# Patient Record
Sex: MALE | Race: ASIAN | NOT HISPANIC OR LATINO | Employment: FULL TIME | URBAN - METROPOLITAN AREA
[De-identification: names, ages, dates, MRNs, and addresses within clinical notes are randomized per-mention and may not be internally consistent; named-entity substitution may affect disease eponyms.]

---

## 2022-04-05 DIAGNOSIS — E78.00 PURE HYPERCHOLESTEROLEMIA: ICD-10-CM

## 2022-04-05 DIAGNOSIS — I10 PRIMARY HYPERTENSION: ICD-10-CM

## 2022-04-05 DIAGNOSIS — R73.03 PREDIABETES: ICD-10-CM

## 2022-04-05 DIAGNOSIS — E03.9 ACQUIRED HYPOTHYROIDISM: ICD-10-CM

## 2022-04-05 DIAGNOSIS — Z00.00 PE (PHYSICAL EXAM), ANNUAL: ICD-10-CM

## 2022-04-05 PROBLEM — F17.201 TOBACCO DEPENDENCE IN REMISSION: Status: ACTIVE | Noted: 2021-12-21

## 2022-04-05 PROBLEM — D56.3 THALASSEMIA TRAIT: Status: ACTIVE | Noted: 2021-12-21

## 2022-04-05 PROBLEM — E66.9 OBESITY: Status: ACTIVE | Noted: 2021-12-21

## 2022-05-03 ENCOUNTER — HOSPITAL ENCOUNTER (OUTPATIENT)
Dept: LAB | Facility: MEDICAL CENTER | Age: 47
End: 2022-05-03
Attending: FAMILY MEDICINE
Payer: COMMERCIAL

## 2022-05-03 DIAGNOSIS — Z00.00 PE (PHYSICAL EXAM), ANNUAL: ICD-10-CM

## 2022-05-03 DIAGNOSIS — E78.00 PURE HYPERCHOLESTEROLEMIA: ICD-10-CM

## 2022-05-03 DIAGNOSIS — E03.9 ACQUIRED HYPOTHYROIDISM: ICD-10-CM

## 2022-05-03 DIAGNOSIS — R73.03 PREDIABETES: ICD-10-CM

## 2022-05-03 LAB
ALBUMIN SERPL BCP-MCNC: 4.6 G/DL (ref 3.2–4.9)
ALBUMIN/GLOB SERPL: 1.6 G/DL
ALP SERPL-CCNC: 69 U/L (ref 30–99)
ALT SERPL-CCNC: 28 U/L (ref 2–50)
ANION GAP SERPL CALC-SCNC: 13 MMOL/L (ref 7–16)
AST SERPL-CCNC: 18 U/L (ref 12–45)
BASOPHILS # BLD AUTO: 0.4 % (ref 0–1.8)
BASOPHILS # BLD: 0.04 K/UL (ref 0–0.12)
BILIRUB SERPL-MCNC: 0.7 MG/DL (ref 0.1–1.5)
BUN SERPL-MCNC: 16 MG/DL (ref 8–22)
CALCIUM SERPL-MCNC: 9.1 MG/DL (ref 8.5–10.5)
CHLORIDE SERPL-SCNC: 100 MMOL/L (ref 96–112)
CHOLEST SERPL-MCNC: 129 MG/DL (ref 100–199)
CO2 SERPL-SCNC: 22 MMOL/L (ref 20–33)
CREAT SERPL-MCNC: 0.77 MG/DL (ref 0.5–1.4)
CREAT UR-MCNC: 144.94 MG/DL
EOSINOPHIL # BLD AUTO: 0.36 K/UL (ref 0–0.51)
EOSINOPHIL NFR BLD: 3.3 % (ref 0–6.9)
ERYTHROCYTE [DISTWIDTH] IN BLOOD BY AUTOMATED COUNT: 35.9 FL (ref 35.9–50)
EST. AVERAGE GLUCOSE BLD GHB EST-MCNC: 140 MG/DL
GFR SERPLBLD CREATININE-BSD FMLA CKD-EPI: 112 ML/MIN/1.73 M 2
GLOBULIN SER CALC-MCNC: 2.8 G/DL (ref 1.9–3.5)
GLUCOSE SERPL-MCNC: 168 MG/DL (ref 65–99)
HBA1C MFR BLD: 6.5 % (ref 4–5.6)
HCT VFR BLD AUTO: 42.5 % (ref 42–52)
HDLC SERPL-MCNC: 37 MG/DL
HGB BLD-MCNC: 13.2 G/DL (ref 14–18)
IMM GRANULOCYTES # BLD AUTO: 0.07 K/UL (ref 0–0.11)
IMM GRANULOCYTES NFR BLD AUTO: 0.6 % (ref 0–0.9)
LDLC SERPL CALC-MCNC: 76 MG/DL
LYMPHOCYTES # BLD AUTO: 3.18 K/UL (ref 1–4.8)
LYMPHOCYTES NFR BLD: 29.1 % (ref 22–41)
MCH RBC QN AUTO: 19.8 PG (ref 27–33)
MCHC RBC AUTO-ENTMCNC: 31.1 G/DL (ref 33.7–35.3)
MCV RBC AUTO: 63.8 FL (ref 81.4–97.8)
MICROALBUMIN UR-MCNC: 5.7 MG/DL
MICROALBUMIN/CREAT UR: 39 MG/G (ref 0–30)
MONOCYTES # BLD AUTO: 1.01 K/UL (ref 0–0.85)
MONOCYTES NFR BLD AUTO: 9.2 % (ref 0–13.4)
NEUTROPHILS # BLD AUTO: 6.26 K/UL (ref 1.82–7.42)
NEUTROPHILS NFR BLD: 57.4 % (ref 44–72)
NRBC # BLD AUTO: 0.02 K/UL
NRBC BLD-RTO: 0.2 /100 WBC
PLATELET # BLD AUTO: 308 K/UL (ref 164–446)
PMV BLD AUTO: 9.2 FL (ref 9–12.9)
POTASSIUM SERPL-SCNC: 4 MMOL/L (ref 3.6–5.5)
PROT SERPL-MCNC: 7.4 G/DL (ref 6–8.2)
RBC # BLD AUTO: 6.66 M/UL (ref 4.7–6.1)
SODIUM SERPL-SCNC: 135 MMOL/L (ref 135–145)
T4 FREE SERPL-MCNC: 1.73 NG/DL (ref 0.93–1.7)
TRIGL SERPL-MCNC: 78 MG/DL (ref 0–149)
TSH SERPL DL<=0.005 MIU/L-ACNC: 0.94 UIU/ML (ref 0.38–5.33)
WBC # BLD AUTO: 10.9 K/UL (ref 4.8–10.8)

## 2022-05-03 PROCEDURE — 83036 HEMOGLOBIN GLYCOSYLATED A1C: CPT

## 2022-05-03 PROCEDURE — 84439 ASSAY OF FREE THYROXINE: CPT

## 2022-05-03 PROCEDURE — 84443 ASSAY THYROID STIM HORMONE: CPT

## 2022-05-03 PROCEDURE — 80053 COMPREHEN METABOLIC PANEL: CPT

## 2022-05-03 PROCEDURE — 85025 COMPLETE CBC W/AUTO DIFF WBC: CPT

## 2022-05-03 PROCEDURE — 82043 UR ALBUMIN QUANTITATIVE: CPT

## 2022-05-03 PROCEDURE — 82570 ASSAY OF URINE CREATININE: CPT

## 2022-05-03 PROCEDURE — 80061 LIPID PANEL: CPT

## 2022-05-03 PROCEDURE — 36415 COLL VENOUS BLD VENIPUNCTURE: CPT

## 2022-07-06 ENCOUNTER — OFFICE VISIT (OUTPATIENT)
Dept: MEDICAL GROUP | Age: 47
End: 2022-07-06
Payer: COMMERCIAL

## 2022-07-06 VITALS
OXYGEN SATURATION: 95 % | HEART RATE: 76 BPM | SYSTOLIC BLOOD PRESSURE: 150 MMHG | BODY MASS INDEX: 33.24 KG/M2 | HEIGHT: 63 IN | DIASTOLIC BLOOD PRESSURE: 96 MMHG | TEMPERATURE: 98.6 F | WEIGHT: 187.6 LBS

## 2022-07-06 DIAGNOSIS — E78.00 PURE HYPERCHOLESTEROLEMIA: ICD-10-CM

## 2022-07-06 DIAGNOSIS — R73.03 PREDIABETES: ICD-10-CM

## 2022-07-06 DIAGNOSIS — E03.9 ACQUIRED HYPOTHYROIDISM: ICD-10-CM

## 2022-07-06 DIAGNOSIS — D56.3 THALASSEMIA TRAIT: ICD-10-CM

## 2022-07-06 DIAGNOSIS — D64.9 ANEMIA, UNSPECIFIED TYPE: ICD-10-CM

## 2022-07-06 DIAGNOSIS — I10 PRIMARY HYPERTENSION: ICD-10-CM

## 2022-07-06 PROCEDURE — 99214 OFFICE O/P EST MOD 30 MIN: CPT | Performed by: INTERNAL MEDICINE

## 2022-07-06 RX ORDER — DEXTROMETHORPHAN HYDROBROMIDE AND PROMETHAZINE HYDROCHLORIDE 15; 6.25 MG/5ML; MG/5ML
SYRUP ORAL
COMMUNITY
Start: 2022-01-13 | End: 2022-07-06

## 2022-07-06 RX ORDER — ATORVASTATIN CALCIUM 40 MG/1
40 TABLET, FILM COATED ORAL
COMMUNITY
Start: 2022-05-01 | End: 2022-07-26 | Stop reason: SDUPTHER

## 2022-07-06 RX ORDER — LEVOTHYROXINE SODIUM 137 UG/1
137 TABLET ORAL
COMMUNITY
Start: 2022-06-21 | End: 2022-07-26 | Stop reason: SDUPTHER

## 2022-07-06 RX ORDER — LOSARTAN POTASSIUM 100 MG/1
100 TABLET ORAL DAILY
COMMUNITY
Start: 2022-04-16 | End: 2022-07-06 | Stop reason: SDUPTHER

## 2022-07-06 RX ORDER — DEXTROMETHORPHAN HYDROBROMIDE AND PROMETHAZINE HYDROCHLORIDE 15; 6.25 MG/5ML; MG/5ML
SYRUP ORAL
Qty: 120 ML | Refills: 2 | Status: SHIPPED | OUTPATIENT
Start: 2022-07-06 | End: 2022-07-26

## 2022-07-06 RX ORDER — FOLIC ACID 1 MG/1
1 TABLET ORAL DAILY
Qty: 90 TABLET | Refills: 1 | Status: SHIPPED | OUTPATIENT
Start: 2022-07-06

## 2022-07-06 RX ORDER — METFORMIN HYDROCHLORIDE 500 MG/1
500 TABLET, EXTENDED RELEASE ORAL 2 TIMES DAILY
Qty: 180 TABLET | Refills: 0 | Status: SHIPPED | OUTPATIENT
Start: 2022-07-06 | End: 2022-07-07

## 2022-07-06 RX ORDER — LIDOCAINE HCL 4 %
CREAM (GRAM) TOPICAL
COMMUNITY
Start: 2022-06-01

## 2022-07-06 RX ORDER — AMLODIPINE BESYLATE 10 MG/1
10 TABLET ORAL
COMMUNITY
Start: 2022-04-13 | End: 2022-07-26 | Stop reason: SDUPTHER

## 2022-07-06 RX ORDER — LOSARTAN POTASSIUM 100 MG/1
100 TABLET ORAL DAILY
Qty: 90 TABLET | Refills: 1 | Status: SHIPPED | OUTPATIENT
Start: 2022-07-06

## 2022-07-06 ASSESSMENT — FIBROSIS 4 INDEX: FIB4 SCORE: 0.5

## 2022-07-06 ASSESSMENT — PATIENT HEALTH QUESTIONNAIRE - PHQ9: CLINICAL INTERPRETATION OF PHQ2 SCORE: 0

## 2022-07-06 NOTE — PROGRESS NOTES
CHIEF COMPLAINT  Chief Complaint   Patient presents with   • Weakness     Pt states since last month he has been feeling weak all over his body.   • Fatigue     X 1 month     HPI  Desmond Callahan is a 46 y.o. male who presents today for the following chronic medical conditions:  Hypertension: He has been out of medications, and his blood pressure has been high.  Denies vision problems/headaches, chest pain/pressure, palpitations/irregular heartbeats.  BMI 33.  Hypercholesterolemia: The patient had abnormal lipid panel in the past.  Current LDL is 76, total cholesterol 129.  No medications.  Diet: Regular.  Exercise: Limited.  BMI 33..  IFG: The patient has history of prediabetes.  The last FBG is 168, A1c 6.5, microalbumin/creatinine ratio 39.  On metformin, 500 mg twice daily.  Complains of fatigue.  Denies polydipsia, polyuria.  Thalassemia trait/anemia: Reviewed CBC, consistent with thalassemia.  Complains of fatigue.  He has not been on B12 or folate supplements.    Reviewed PMH, PSH, FH, SH, ALL, HCM/IMM, no changes  Reviewed MEDS, no changes    Patient Active Problem List    Diagnosis Date Noted   • Prediabetes 04/05/2022   • Primary hypertension 04/05/2022   • Pure hypercholesterolemia 04/05/2022   • Thalassemia trait 12/21/2021   • Obesity 12/21/2021   • Tobacco dependence in remission 12/21/2021   • Calculus of gallbladder with other cholecystitis, without mention of obstruction 09/25/2015   • Hypothyroid 04/17/2014   • Insomnia 04/17/2014     CURRENT MEDICATIONS  Current Outpatient Medications   Medication Sig Dispense Refill   • amLODIPine (NORVASC) 10 MG Tab Take 10 mg by mouth every day.     • atorvastatin (LIPITOR) 40 MG Tab Take 40 mg by mouth every day. FOR 30 DAYS     • [facility] VITAMIN D3 250 MCG (20792 UT) Cap TAKE 5 CAPSULES (50,000 UNITS TOTAL) BY MOUTH ONCE A WEEK FOR 30 DAYS *OTC NOT COVERED     • levothyroxine (SYNTHROID) 137 MCG Tab Take 137 mcg by mouth every morning before breakfast.     •  folic acid (FOLVITE) 1 MG Tab Take 1 Tablet by mouth every day. 90 Tablet 1   • metFORMIN ER (GLUCOPHAGE XR) 500 MG TABLET SR 24 HR Take 1 Tablet by mouth 2 times a day. 180 Tablet 0   • losartan (COZAAR) 100 MG Tab Take 1 Tablet by mouth every day. 90 Tablet 1   • promethazine-dextromethorphan (PROMETHAZINE-DM) 6.25-15 MG/5ML syrup TAKE 5 ML BY MOUTH EVERY 6 HOURS AS NEEDED 120 mL 2   • SYNTHROID 100 MCG Tab TAKE ONE TABLET BY MOUTH ONCE DAILY (Patient not taking: Reported on 2022) 90 Tab 4     No current facility-administered medications for this visit.     ALLERGIES  Allergies: Patient has no known allergies.  PAST MEDICAL HISTORY  Past Medical History:   Diagnosis Date   • Primary hypertension 2022   • Pure hypercholesterolemia 2022   • Thalassemia trait 2021   • Tobacco dependence in remission 2021   • Other specified disorder of intestines 2015    diarrhea   • Hypothyroid 2014   • GERD (gastroesophageal reflux disease)    • Headache(784.0)    • Hepatitis B      SURGICAL HISTORY  He  has a past surgical history that includes dental extraction(s) and abdoulaye by laparoscopy (2015).  SOCIAL HISTORY  Social History     Tobacco Use   • Smoking status: Former Smoker     Packs/day: 1.00     Years: 20.00     Pack years: 20.00     Types: Cigarettes   • Smokeless tobacco: Never Used   Substance Use Topics   • Alcohol use: No     Alcohol/week: 1.5 oz     Types: 3 Cans of beer per week   • Drug use: No     Social History     Social History Narrative   • Not on file     FAMILY HISTORY  Family History   Problem Relation Age of Onset   • Cancer Mother         stomach   • Diabetes Father    • Heart Disease Father    • Hypertension Father    • Hyperlipidemia Father    • Lung Disease Father         Smoker     Family Status   Relation Name Status   • Mo     • Fa     • Sis  Alive   • Bro  Alive   • MAunt     • MUnc     • PAunt     • PUnc     • MGMo   "   • MGFa     • PGMo     • PGFa       ROS   Constitutional: Negative for fever, chills.  HENT: Negative for congestion, sore throat.  Eyes: Negative for vision problems.   Respiratory: Negative for cough, shortness of breath.  Cardiovascular: Negative for chest pain, palpitations.   Gastrointestinal: Negative for heartburn, nausea, abdominal pain.   Genitourinary: Negative for dysuria.  Musculoskeletal: Negative for significant myalgia, back and joint pain.   Skin: Negative for rash.   Neuro: Negative for dizziness, weakness and headaches.   Endo/Heme/Allergies: Does not bruise/bleed easily.   Psychiatric/Behavioral: Negative for depression.    PHYSICAL EXAM   Blood Pressure (Abnormal) 150/96 (BP Location: Right arm, Patient Position: Sitting, BP Cuff Size: Adult)   Pulse 76   Temperature 37 °C (98.6 °F) (Temporal)   Height 1.6 m (5' 3\")   Weight 85.1 kg (187 lb 9.6 oz)   Oxygen Saturation 95%  Body mass index is 33.23 kg/m².  General:  NAD, well appearing  HEENT:   NC/AT, PERRLA, EOMI.  Cardiovascular: unlabored breathing, no peripheral cyanosis or swelling.  Lungs:   no respiratory distress.  Abdomen: non- distended.  Extremities:  No LE swelling.  Skin:  Warm, dry.  No erythema. No rash.   Neurologic: Alert & oriented x 3. CN II-XII grossly intact. No focal deficits.  Psychiatric:  Affect normal, mood normal, judgment normal.    Labs     Labs are reviewed and discussed with a patient  Lab Results   Component Value Date/Time    CHOLSTRLTOT 129 2022 07:16 AM    LDL 76 2022 07:16 AM    HDL 37 (A) 2022 07:16 AM    TRIGLYCERIDE 78 2022 07:16 AM       Lab Results   Component Value Date/Time    SODIUM 135 2022 07:16 AM    POTASSIUM 4.0 2022 07:16 AM    CHLORIDE 100 2022 07:16 AM    CO2 22 2022 07:16 AM    GLUCOSE 168 (H) 2022 07:16 AM    BUN 16 2022 07:16 AM    CREATININE 0.77 2022 07:16 AM     Lab Results   Component " Value Date/Time    ALKPHOSPHAT 69 05/03/2022 07:16 AM    ASTSGOT 18 05/03/2022 07:16 AM    ALTSGPT 28 05/03/2022 07:16 AM    TBILIRUBIN 0.7 05/03/2022 07:16 AM      Lab Results   Component Value Date/Time    HBA1C 6.5 (H) 05/03/2022 07:16 AM     No results found for: TSH  Lab Results   Component Value Date/Time    FREET4 1.73 (H) 05/03/2022 07:16 AM    FREET4 1.05 06/25/2014 11:30 AM     Lab Results   Component Value Date/Time    WBC 10.9 (H) 05/03/2022 07:16 AM    RBC 6.66 (H) 05/03/2022 07:16 AM    HEMOGLOBIN 13.2 (L) 05/03/2022 07:16 AM    HEMATOCRIT 42.5 05/03/2022 07:16 AM    MCV 63.8 (L) 05/03/2022 07:16 AM    MCH 19.8 (L) 05/03/2022 07:16 AM    MCHC 31.1 (L) 05/03/2022 07:16 AM    MPV 9.2 05/03/2022 07:16 AM    NEUTSPOLYS 57.40 05/03/2022 07:16 AM    LYMPHOCYTES 29.10 05/03/2022 07:16 AM    MONOCYTES 9.20 05/03/2022 07:16 AM    EOSINOPHILS 3.30 05/03/2022 07:16 AM    BASOPHILS 0.40 05/03/2022 07:16 AM    HYPOCHROMIA 2+ 04/17/2014 09:46 AM    ANISOCYTOSIS 1+ 11/14/2013 09:10 AM      Imaging     None    Assessment and Plan     Desmond Callahan is a 46 y.o. male    1. Primary hypertension  Noncompliance, ran out of medications, blood pressure is high but he has been asymptomatic.  Restart losartan, advised low-calorie/salt diet, daily exercise, weight control  - Comp Metabolic Panel; Future  - losartan (COZAAR) 100 MG Tab; Take 1 Tablet by mouth every day.  Dispense: 90 Tablet; Refill: 1  - Patient identified as having weight management issue.  Appropriate orders and counseling given.    2. Pure hypercholesterolemia  LDL was 76, total cholesterol 129, no medications.  Advised for lifestyle as above  - Comp Metabolic Panel; Future  - Patient identified as having weight management issue.  Appropriate orders and counseling given.    3. Prediabetes  He has been on metformin, he has FBG in diabetes range.  He will continue metformin current prescription, switch to metformin extended release, the same dose after out  of supply  Advised for lifestyle as above  - metFORMIN ER (GLUCOPHAGE XR) 500 MG TABLET SR 24 HR; Take 1 Tablet by mouth 2 times a day.  Dispense: 180 Tablet; Refill: 0  - HEMOGLOBIN A1C; Future  - Comp Metabolic Panel; Future  - Patient identified as having weight management issue.  Appropriate orders and counseling given.    4. Acquired hypothyroidism  He is asymptomatic regarding to elevated FT4, normal TSH.  In review of BMI 33, 8, levothyroxine dose was not changed  - levothyroxine (SYNTHROID) 137 MCG Tab; Take 137 mcg by mouth every morning before breakfast.    5. Thalassemia trait  Reviewed CBC, given folic acid, follow-up labs  - folic acid (FOLVITE) 1 MG Tab; Take 1 Tablet by mouth every day.  Dispense: 90 Tablet; Refill: 1  6. Anemia, unspecified type  - folic acid (FOLVITE) 1 MG Tab; Take 1 Tablet by mouth every day.  Dispense: 90 Tablet; Refill: 1  - CBC WITH DIFFERENTIAL; Future  - IRON/TOTAL IRON BIND; Future  - VIT B12,  FOLIC ACID    Counseling:   - Smoking:  Nonsmoker    Followup: Return in about 2 weeks (around 7/20/2022) for LABS.    All questions are answered.    Please note that this dictation was created using voice recognition software, and that there might be errors of oliverio and possibly content.

## 2022-07-06 NOTE — TELEPHONE ENCOUNTER
Received request via: Pharmacy    Was the patient seen in the last year in this department? Yes 7/1/22    Does the patient have an active prescription (recently filled or refills available) for medication(s) requested? No     Pharmacy comment: Script Clarification:PLEASE VERIFY DRUG/DOSING. PT HAS BEEN ON METFORMIN IR. NOW SENT IN AS METFORMIN ER TWICE DAILY DOSING.

## 2022-07-26 ENCOUNTER — OFFICE VISIT (OUTPATIENT)
Dept: MEDICAL GROUP | Age: 47
End: 2022-07-26
Payer: COMMERCIAL

## 2022-07-26 VITALS
HEART RATE: 67 BPM | OXYGEN SATURATION: 98 % | SYSTOLIC BLOOD PRESSURE: 136 MMHG | WEIGHT: 186 LBS | BODY MASS INDEX: 32.96 KG/M2 | TEMPERATURE: 97 F | DIASTOLIC BLOOD PRESSURE: 82 MMHG | HEIGHT: 63 IN

## 2022-07-26 DIAGNOSIS — Z20.5 EXPOSURE TO HEPATITIS B: ICD-10-CM

## 2022-07-26 DIAGNOSIS — Z76.89 ENCOUNTER TO ESTABLISH CARE WITH NEW DOCTOR: ICD-10-CM

## 2022-07-26 DIAGNOSIS — Z23 NEED FOR VACCINATION: ICD-10-CM

## 2022-07-26 DIAGNOSIS — I10 PRIMARY HYPERTENSION: ICD-10-CM

## 2022-07-26 DIAGNOSIS — D56.3 THALASSEMIA TRAIT: ICD-10-CM

## 2022-07-26 DIAGNOSIS — I49.3 PVC'S (PREMATURE VENTRICULAR CONTRACTIONS): ICD-10-CM

## 2022-07-26 DIAGNOSIS — E03.9 ACQUIRED HYPOTHYROIDISM: ICD-10-CM

## 2022-07-26 DIAGNOSIS — E78.00 PURE HYPERCHOLESTEROLEMIA: ICD-10-CM

## 2022-07-26 DIAGNOSIS — Z87.891 FORMER SMOKER: ICD-10-CM

## 2022-07-26 DIAGNOSIS — D64.9 MILD ANEMIA: ICD-10-CM

## 2022-07-26 DIAGNOSIS — R80.9 MICROALBUMINURIA: ICD-10-CM

## 2022-07-26 DIAGNOSIS — E11.9 TYPE 2 DIABETES MELLITUS WITHOUT COMPLICATION, WITHOUT LONG-TERM CURRENT USE OF INSULIN (HCC): ICD-10-CM

## 2022-07-26 DIAGNOSIS — J30.9 ALLERGIC RHINITIS, UNSPECIFIED SEASONALITY, UNSPECIFIED TRIGGER: ICD-10-CM

## 2022-07-26 PROBLEM — F17.201 TOBACCO DEPENDENCE IN REMISSION: Status: RESOLVED | Noted: 2021-12-21 | Resolved: 2022-07-26

## 2022-07-26 PROCEDURE — 90471 IMMUNIZATION ADMIN: CPT | Performed by: FAMILY MEDICINE

## 2022-07-26 PROCEDURE — 90677 PCV20 VACCINE IM: CPT | Performed by: FAMILY MEDICINE

## 2022-07-26 PROCEDURE — 99215 OFFICE O/P EST HI 40 MIN: CPT | Mod: 25 | Performed by: FAMILY MEDICINE

## 2022-07-26 RX ORDER — DEXTROMETHORPHAN HYDROBROMIDE AND PROMETHAZINE HYDROCHLORIDE 15; 6.25 MG/5ML; MG/5ML
SYRUP ORAL
Qty: 120 ML | Refills: 2 | Status: SHIPPED | OUTPATIENT
Start: 2022-07-26

## 2022-07-26 RX ORDER — METOPROLOL SUCCINATE 25 MG/1
25 TABLET, EXTENDED RELEASE ORAL DAILY
Qty: 90 TABLET | Refills: 3 | Status: SHIPPED | OUTPATIENT
Start: 2022-07-26 | End: 2023-08-17

## 2022-07-26 RX ORDER — ATORVASTATIN CALCIUM 40 MG/1
40 TABLET, FILM COATED ORAL
Qty: 90 TABLET | Refills: 3 | Status: SHIPPED | OUTPATIENT
Start: 2022-07-26

## 2022-07-26 RX ORDER — AMLODIPINE BESYLATE 10 MG/1
10 TABLET ORAL
Qty: 90 TABLET | Refills: 1 | Status: SHIPPED | OUTPATIENT
Start: 2022-07-26

## 2022-07-26 RX ORDER — LEVOTHYROXINE SODIUM 0.12 MG/1
137 TABLET ORAL
Qty: 90 TABLET | Refills: 3 | Status: SHIPPED | OUTPATIENT
Start: 2022-07-26

## 2022-07-26 ASSESSMENT — FIBROSIS 4 INDEX: FIB4 SCORE: 0.51

## 2022-07-26 NOTE — PROGRESS NOTES
Subjective:   CC: Establish care    HPI:     Desmond Callahan is a 47 y.o. male, established patient of the clinic.     Patient was previously diagnosed with prediabetes, hypothyroidism, hypertension, hyperlipidemia, thalassemia trait with mild anemia.  Patient is taking all medications as directed.  His last blood tests was in May 2022 which reveals A1c of 5.1.  Patient tolerated all his medications well, no side effect reported.    Patient is , sexually active, has 2 children, works as a  for local [employer].    He complains of intermittent nocturnal cough with copious sputum production.  He also complains of severe nasal congestion especially on the left, sneezing, early morning sputum production, rhinorrhea.  Patient has been taking promethazine-dextromethorphan syrup as needed for sleep at night, which appeared to help.  He endorses intermittent symptoms of GERD with some type of foods.  However, he usually does not have GERD at night.    Patient had 48-hour Holter monitor done in February 2022 for intermittent palpitation and couple episodes of syncope with standing.  Patient had not returned to his previous doctor or followed up with cardiology to discuss findings.  Negative history of drugs, alcohol, excessive consumption of stimulants.  Patient is a former smoker.  He used to smoke 1 pack/day for 20 years.  He quit in 2017.  He drinks approximately 2 L of fluid per day.           Current medicines (including changes today)  Current Outpatient Medications   Medication Sig Dispense Refill   • levothyroxine (SYNTHROID) 125 MCG Tab Take 1 Tablet by mouth every morning before breakfast. 90 Tablet 3   • atorvastatin (LIPITOR) 40 MG Tab Take 1 Tablet by mouth every day. FOR 30 DAYS 90 Tablet 3   • amLODIPine (NORVASC) 10 MG Tab Take 1 Tablet by mouth every day. 90 Tablet 1   • metFORMIN (GLUCOPHAGE) 500 MG Tab Take 2 Tablets by mouth 2 times a day with meals. 360 Tablet 1   • metoprolol SR  "(TOPROL XL) 25 MG TABLET SR 24 HR Take 1 Tablet by mouth every day. 90 Tablet 3   • promethazine-dextromethorphan (PROMETHAZINE-DM) 6.25-15 MG/5ML syrup TAKE 5 ML BY MOUTH EVERY 6 HOURS AS NEEDED 120 mL 2   • [facility] VITAMIN D3 250 MCG (62528 UT) Cap TAKE 5 CAPSULES (50,000 UNITS TOTAL) BY MOUTH ONCE A WEEK FOR 30 DAYS *OTC NOT COVERED     • folic acid (FOLVITE) 1 MG Tab Take 1 Tablet by mouth every day. 90 Tablet 1   • losartan (COZAAR) 100 MG Tab Take 1 Tablet by mouth every day. 90 Tablet 1     No current facility-administered medications for this visit.     He  has a past medical history of GERD (gastroesophageal reflux disease), Headache(784.0), Hepatitis B, Hypothyroid (4/17/2014), Other specified disorder of intestines (9/2015), Primary hypertension (4/5/2022), Pure hypercholesterolemia (4/5/2022), Thalassemia trait (12/21/2021), and Tobacco dependence in remission (12/21/2021).    He has no past medical history of Allergy, Anxiety, Arrhythmia, Arthritis, ASTHMA, Blood transfusion, Cancer (HCC), CATARACT, CHF (congestive heart failure) (HCC), Clotting disorder (HCC), COPD, Depression, Diabetes, EMPHYSEMA, Glaucoma, Goiter, Heart attack (HCC), Heart murmur, HIV (human immunodeficiency virus infection), IBD (inflammatory bowel disease), Kidney disease, Meningitis, Migraine, Seizure (HCC), Stroke (HCC), Tuberculosis, Ulcer, or Urinary tract infection, site not specified.    I reviewed patient's problem list, allergies, medications, family hx, social hx with patient and update EPIC.        Objective:     /82 (BP Location: Left arm, Patient Position: Sitting, BP Cuff Size: Large adult)   Pulse 67   Temp 36.1 °C (97 °F) (Temporal)   Ht 1.6 m (5' 3\")   Wt 84.4 kg (186 lb)   SpO2 98%  Body mass index is 32.95 kg/m².    Physical Exam:  Constitutional: awake, alert, in no distress.  Skin: Warm, dry, good turgor, no rashes, bruises, ulcers in visible areas.  Eye: conjunctiva clear, lids neg for edema or " lesions.  ENMT: TM and auditory canals wnl.  Pale, congested nasal mucosa nasal mucosa with inferior nasal turbinate hypertrophy. Lips without lesions, good dentition, oropharynx clear.  Right to left nasal septal deviation.  Neck: Trachea midline, no masses, no thyromegaly. No cervical or supraclavicular lymphadenopathy  Respiratory: Unlabored respiratory effort, lungs clear to auscultation, no wheezes, no rales.  Cardiovascular: Normal S1, S2, no murmur, no pedal edema.  Abdomen: Soft, non-tender to palpation, active BS, no hernia, no hepatosplenomegaly, negative rebound or guarding.   Neuro: CN2-12 grossly intact. Strength 5/5, reflexes 2/4 in all extremities, no sensory deficits.   Psych: Oriented x3, affect and mood wnl, intact judgement and insight.       Assessment and Plan:   The following treatment plan was discussed    1. Type 2 diabetes mellitus without complication, without long-term current use of insulin (HCC)  Patient was previously diagnosed with prediabetes, currently taking metformin 500 mg twice daily.  His most recent labs show A1C of 6.5.   - metFORMIN (GLUCOPHAGE) 500 MG Tab; Take 2 Tablets by mouth 2 times a day with meals.  Dispense: 360 Tablet; Refill: 1  - HEMOGLOBIN A1C; Future  - Comp Metabolic Panel; Future  - CBC WITH DIFFERENTIAL; Future  - dietary modification, exercise, weight loss  - Annual eye exam: Done earlier this year with Dr. Garcia, will request records  - Regular foot exam  - Discussed prevention and management of hypoglycemia  - Side effects of all medications discussed with patient  - Follow up in 3 months.     2. PVC's (premature ventricular contractions)  Patient complaining of intermittent palpitation and episodic syncope with standing.  48-hour Holter monitor done in February 2022 was notable for intermittent PVCs and PACs.  - metoprolol SR (TOPROL XL) 25 MG TABLET SR 24 HR; Take 1 Tablet by mouth every day.  Dispense: 90 Tablet; Refill: 3  - hydration   - Avoidance  alcohol, tobacco, excessive consumption of stimulants    3. Acquired hypothyroidism  Chronic, currently taking 135 mcg of levothyroxine daily.  His most recent labs showed normal TSH with mildly elevated T4.  Given history of PVC, PAC, and palpitation, will reduce the doses of levothyroxine to 125 mcg daily.  Patient will have repeat labs prior to office visit for reassessment.  - levothyroxine (SYNTHROID) 125 MCG Tab; Take 1 Tablet by mouth every morning before breakfast.  Dispense: 90 Tablet; Refill: 3  - TSH; Future  - FREE THYROXINE; Future    4. Need for vaccination  - Pneumococcal Conjugate Vaccine 20-Valent (19 yrs+)    5.  Primary hypertension  7. Microalbuminuria  Chronic, controlled with amlodipine 10 mg and losartan 100 mg daily, no s/e reported, will continue.    - amLODIPine (NORVASC) 10 MG Tab; Take 1 Tablet by mouth every day.  Dispense: 90 Tablet; Refill: 1  - Continue losartan 100 mg daily  - dietary modification, exercise, and weight loss.   - avoid alcohol, drugs, tobacco products     8. Pure hypercholesterolemia  - atorvastatin (LIPITOR) 40 MG Tab; Take 1 Tablet by mouth every day. FOR 30 DAYS  Dispense: 90 Tablet; Refill: 3    9. Thalassemia trait  10. Mild anemia  Chronic thalassemia trait with mild anemia.  Patient is asymptomatic.  This condition appears to be stable.  We will continue to monitor.    11. Exposure to hepatitis B  - HEP B CORE AB TOTAL; Future  - HEP B SURFACE AB; Future  - HEP B SURFACE ANTIGEN; Future    12. Former smoker  Will monitor.     13. Allergic rhinitis, unspecified seasonality, unspecified trigger  History and exams are concerning for postnasal drips from untreated allergic rhinitis leading to nocturnal cough.  We will start a trial of treatments for allergic rhinitis.  Patient can continue to take promethazine-dextromethorphan as needed for nocturnal cough.  Follow-up in 3 months.  - Nasal saline irrigation 2-3 times per day to reduce allergen load  -  Over-the-counter nasal steroid (Flonase, NasoNex, or Nasacort)   - Over-the-counter oral anti-histamine PRN (Claritin, Allegra, or Zyrtec)      14. Encounter to establish care with new doctor  General health and wellness counseling provided.        Total time spent reviewing pt's chart, labs, notes, imaging, and counseling/prescribing medications to patient before, during, and after the visit: 40 minutes.        Madelaine Sanchez M.D.      Followup: Return in about 3 months (around 10/26/2022) for Diabetes.    Please note that this dictation was created using voice recognition software. I have made every reasonable attempt to correct obvious errors, but I expect that there are errors of grammar and possibly content that I did not discover before finalizing the note.

## 2022-07-26 NOTE — PATIENT INSTRUCTIONS
Irrigate your nose once daily       2 sprays per nostril once daily after sinus rinse above        Take Allegra 180 mg, 1 pill daily

## 2023-04-20 DIAGNOSIS — E11.9 TYPE 2 DIABETES MELLITUS WITHOUT COMPLICATION: Primary | ICD-10-CM

## 2023-04-24 ENCOUNTER — HOSPITAL ENCOUNTER (OUTPATIENT)
Dept: RADIOLOGY | Facility: HOSPITAL | Age: 48
Discharge: HOME | End: 2023-04-24
Attending: PODIATRIST
Payer: MEDICAID

## 2023-04-24 DIAGNOSIS — E11.9 TYPE 2 DIABETES MELLITUS WITHOUT COMPLICATION: ICD-10-CM

## 2023-04-24 PROCEDURE — 73630 X-RAY EXAM OF FOOT: CPT | Mod: TC,50

## 2024-03-19 ENCOUNTER — HOSPITAL ENCOUNTER (EMERGENCY)
Facility: HOSPITAL | Age: 49
Discharge: HOME | End: 2024-03-19
Attending: EMERGENCY MEDICINE
Payer: MEDICAID

## 2024-03-19 VITALS
SYSTOLIC BLOOD PRESSURE: 150 MMHG | TEMPERATURE: 98 F | HEIGHT: 64 IN | HEART RATE: 76 BPM | OXYGEN SATURATION: 99 % | DIASTOLIC BLOOD PRESSURE: 96 MMHG | BODY MASS INDEX: 30.73 KG/M2 | WEIGHT: 180 LBS | RESPIRATION RATE: 16 BRPM

## 2024-03-19 DIAGNOSIS — R94.31 ABNORMAL EKG: ICD-10-CM

## 2024-03-19 DIAGNOSIS — R10.13 EPIGASTRIC ABDOMINAL PAIN: ICD-10-CM

## 2024-03-19 DIAGNOSIS — K29.00 ACUTE GASTRITIS, PRESENCE OF BLEEDING UNSPECIFIED, UNSPECIFIED GASTRITIS TYPE: Primary | ICD-10-CM

## 2024-03-19 LAB
ALBUMIN SERPL BCP-MCNC: 5.1 G/DL (ref 3.5–5.2)
ALP SERPL-CCNC: 54 U/L (ref 55–135)
ALT SERPL W/O P-5'-P-CCNC: 29 U/L (ref 10–44)
ANION GAP SERPL CALC-SCNC: 9 MMOL/L (ref 8–16)
AST SERPL-CCNC: 14 U/L (ref 10–40)
BASOPHILS # BLD AUTO: 0.03 K/UL (ref 0–0.2)
BASOPHILS NFR BLD: 0.2 % (ref 0–1.9)
BILIRUB SERPL-MCNC: 0.6 MG/DL (ref 0.1–1)
BILIRUB UR QL STRIP: NEGATIVE
BUN SERPL-MCNC: 14 MG/DL (ref 6–20)
CALCIUM SERPL-MCNC: 10.2 MG/DL (ref 8.7–10.5)
CHLORIDE SERPL-SCNC: 98 MMOL/L (ref 95–110)
CLARITY UR: CLEAR
CO2 SERPL-SCNC: 29 MMOL/L (ref 23–29)
COLOR UR: COLORLESS
CREAT SERPL-MCNC: 1 MG/DL (ref 0.5–1.4)
DIFFERENTIAL METHOD BLD: ABNORMAL
EOSINOPHIL # BLD AUTO: 0.2 K/UL (ref 0–0.5)
EOSINOPHIL NFR BLD: 1.9 % (ref 0–8)
ERYTHROCYTE [DISTWIDTH] IN BLOOD BY AUTOMATED COUNT: 17.8 % (ref 11.5–14.5)
EST. GFR  (NO RACE VARIABLE): >60 ML/MIN/1.73 M^2
GLUCOSE SERPL-MCNC: 182 MG/DL (ref 70–110)
GLUCOSE UR QL STRIP: NEGATIVE
HCT VFR BLD AUTO: 43.9 % (ref 40–54)
HGB BLD-MCNC: 13.6 G/DL (ref 14–18)
HGB UR QL STRIP: NEGATIVE
IMM GRANULOCYTES # BLD AUTO: 0.05 K/UL (ref 0–0.04)
IMM GRANULOCYTES NFR BLD AUTO: 0.4 % (ref 0–0.5)
KETONES UR QL STRIP: NEGATIVE
LEUKOCYTE ESTERASE UR QL STRIP: NEGATIVE
LIPASE SERPL-CCNC: 26 U/L (ref 4–60)
LYMPHOCYTES # BLD AUTO: 3.4 K/UL (ref 1–4.8)
LYMPHOCYTES NFR BLD: 27.3 % (ref 18–48)
MCH RBC QN AUTO: 20.1 PG (ref 27–31)
MCHC RBC AUTO-ENTMCNC: 31 G/DL (ref 32–36)
MCV RBC AUTO: 65 FL (ref 82–98)
MONOCYTES # BLD AUTO: 0.7 K/UL (ref 0.3–1)
MONOCYTES NFR BLD: 5.9 % (ref 4–15)
NEUTROPHILS # BLD AUTO: 8.1 K/UL (ref 1.8–7.7)
NEUTROPHILS NFR BLD: 64.3 % (ref 38–73)
NITRITE UR QL STRIP: NEGATIVE
NRBC BLD-RTO: 0 /100 WBC
PH UR STRIP: 7 [PH] (ref 5–8)
PLATELET # BLD AUTO: 353 K/UL (ref 150–450)
PMV BLD AUTO: 8.6 FL (ref 9.2–12.9)
POTASSIUM SERPL-SCNC: 3.8 MMOL/L (ref 3.5–5.1)
PROT SERPL-MCNC: 8.4 G/DL (ref 6–8.4)
PROT UR QL STRIP: NEGATIVE
RBC # BLD AUTO: 6.76 M/UL (ref 4.6–6.2)
SODIUM SERPL-SCNC: 136 MMOL/L (ref 136–145)
SP GR UR STRIP: <1.005 (ref 1–1.03)
TROPONIN I SERPL HS-MCNC: 3.3 PG/ML (ref 0–14.9)
URN SPEC COLLECT METH UR: ABNORMAL
UROBILINOGEN UR STRIP-ACNC: NEGATIVE EU/DL
WBC # BLD AUTO: 12.62 K/UL (ref 3.9–12.7)

## 2024-03-19 PROCEDURE — 81003 URINALYSIS AUTO W/O SCOPE: CPT | Performed by: NURSE PRACTITIONER

## 2024-03-19 PROCEDURE — 80053 COMPREHEN METABOLIC PANEL: CPT | Performed by: NURSE PRACTITIONER

## 2024-03-19 PROCEDURE — 93005 ELECTROCARDIOGRAM TRACING: CPT | Performed by: GENERAL PRACTICE

## 2024-03-19 PROCEDURE — 93010 ELECTROCARDIOGRAM REPORT: CPT | Mod: ,,, | Performed by: GENERAL PRACTICE

## 2024-03-19 PROCEDURE — 85025 COMPLETE CBC W/AUTO DIFF WBC: CPT | Performed by: NURSE PRACTITIONER

## 2024-03-19 PROCEDURE — 84484 ASSAY OF TROPONIN QUANT: CPT | Performed by: NURSE PRACTITIONER

## 2024-03-19 PROCEDURE — 25000003 PHARM REV CODE 250: Performed by: EMERGENCY MEDICINE

## 2024-03-19 PROCEDURE — 83690 ASSAY OF LIPASE: CPT | Performed by: NURSE PRACTITIONER

## 2024-03-19 PROCEDURE — 96374 THER/PROPH/DIAG INJ IV PUSH: CPT

## 2024-03-19 PROCEDURE — 99285 EMERGENCY DEPT VISIT HI MDM: CPT | Mod: 25

## 2024-03-19 RX ORDER — LEVOTHYROXINE SODIUM 125 UG/1
CAPSULE ORAL
COMMUNITY

## 2024-03-19 RX ORDER — LIDOCAINE HYDROCHLORIDE 20 MG/ML
15 SOLUTION OROPHARYNGEAL ONCE
Status: COMPLETED | OUTPATIENT
Start: 2024-03-19 | End: 2024-03-19

## 2024-03-19 RX ORDER — FAMOTIDINE 10 MG/ML
20 INJECTION INTRAVENOUS
Status: COMPLETED | OUTPATIENT
Start: 2024-03-19 | End: 2024-03-19

## 2024-03-19 RX ORDER — ALUMINUM HYDROXIDE, MAGNESIUM HYDROXIDE, AND SIMETHICONE 1200; 120; 1200 MG/30ML; MG/30ML; MG/30ML
30 SUSPENSION ORAL ONCE
Status: COMPLETED | OUTPATIENT
Start: 2024-03-19 | End: 2024-03-19

## 2024-03-19 RX ORDER — HYDROCHLOROTHIAZIDE 25 MG/1
25 TABLET ORAL
COMMUNITY

## 2024-03-19 RX ORDER — LEVOTHYROXINE SODIUM 100 UG/1
1 CAPSULE ORAL
COMMUNITY

## 2024-03-19 RX ORDER — CEPHRADINE 500 MG
CAPSULE ORAL
COMMUNITY

## 2024-03-19 RX ORDER — ATORVASTATIN CALCIUM 40 MG/1
40 TABLET, FILM COATED ORAL
COMMUNITY

## 2024-03-19 RX ORDER — PANTOPRAZOLE SODIUM 40 MG/1
40 TABLET, DELAYED RELEASE ORAL DAILY
Qty: 30 TABLET | Refills: 11 | Status: SHIPPED | OUTPATIENT
Start: 2024-03-19 | End: 2025-03-19

## 2024-03-19 RX ORDER — AMLODIPINE BESYLATE 10 MG/1
TABLET ORAL
COMMUNITY

## 2024-03-19 RX ORDER — SUCRALFATE 1 G/1
1 TABLET ORAL
Qty: 40 TABLET | Refills: 0 | Status: SHIPPED | OUTPATIENT
Start: 2024-03-19

## 2024-03-19 RX ADMIN — FAMOTIDINE 20 MG: 10 INJECTION INTRAVENOUS at 10:03

## 2024-03-19 RX ADMIN — ALUMINUM HYDROXIDE, MAGNESIUM HYDROXIDE, AND SIMETHICONE 30 ML: 1200; 120; 1200 SUSPENSION ORAL at 10:03

## 2024-03-19 RX ADMIN — LIDOCAINE HYDROCHLORIDE 15 ML: 20 SOLUTION ORAL at 10:03

## 2024-03-19 NOTE — ED PROVIDER NOTES
Encounter Date: 3/19/2024       History     Chief Complaint   Patient presents with    Abdominal Pain     ONSET SUNDAY, SENT FROM URGENT CARE FOR EVAL    Abnormal ECG     This is a 48-year-old male with history of hypertension and diabetes, presenting with abdominal pain.  The pain began 2 days ago.  The pain is in his epigastrium and left upper quadrant, occasionally radiates upward.  It is worse at night and lying flat.  Worse with eating.  He denies nausea, vomiting, diarrhea.  He denies chest pain or shortness a breath or cough.  He had an EKG that he told was abnormal at urgent care and referred here.    The history is provided by the patient.     Review of patient's allergies indicates:  No Known Allergies  Past Medical History:   Diagnosis Date    Diabetes mellitus     Hypertension     Mixed hyperlipidemia     Thyroid disease      No past surgical history on file.  No family history on file.     Review of Systems   Gastrointestinal:  Positive for abdominal pain.   All other systems reviewed and are negative.      Physical Exam     Initial Vitals [03/19/24 0909]   BP Pulse Resp Temp SpO2   (!) 150/96 76 16 97.8 °F (36.6 °C) 99 %      MAP       --         Physical Exam    Nursing note and vitals reviewed.  Constitutional: He appears well-developed and well-nourished. He is not diaphoretic. No distress.   HENT:   Head: Normocephalic and atraumatic.   Eyes: Conjunctivae are normal.   Neck: Neck supple.   Normal range of motion.  Cardiovascular:  Normal rate.           Pulmonary/Chest: No respiratory distress.   Abdominal: Abdomen is soft. He exhibits no distension. There is no abdominal tenderness.   Musculoskeletal:         General: No edema.      Cervical back: Normal range of motion and neck supple.     Neurological: He is alert. He has normal strength.   Skin: Skin is warm and dry. No rash noted. No erythema.   Psychiatric: He has a normal mood and affect.         ED Course   Procedures  Labs Reviewed   CBC  W/ AUTO DIFFERENTIAL - Abnormal; Notable for the following components:       Result Value    RBC 6.76 (*)     Hemoglobin 13.6 (*)     MCV 65 (*)     MCH 20.1 (*)     MCHC 31.0 (*)     RDW 17.8 (*)     MPV 8.6 (*)     Gran # (ANC) 8.1 (*)     Immature Grans (Abs) 0.05 (*)     All other components within normal limits   COMPREHENSIVE METABOLIC PANEL - Abnormal; Notable for the following components:    Glucose 182 (*)     Alkaline Phosphatase 54 (*)     All other components within normal limits   URINALYSIS, REFLEX TO URINE CULTURE - Abnormal; Notable for the following components:    Color, UA Colorless (*)     Specific Gravity, UA <1.005 (*)     All other components within normal limits    Narrative:     Specimen Source->Urine   LIPASE   TROPONIN I HIGH SENSITIVITY     EKG Readings: (Independently Interpreted)   Sinus rhythm.  Seventy-two beats/minute.  Normal axis.  No ST elevation.  Incomplete right bundle branch block.     ECG Results              EKG 12-lead (In process)        Collection Time Result Time QRS Duration OHS QTC Calculation    03/19/24 09:11:36 03/19/24 10:08:52 98 431                     In process by Interface, Lab In [facility] (03/19/24 10:08:54)                   Narrative:    Test Reason : R94.31,    Vent. Rate : 072 BPM     Atrial Rate : 072 BPM     P-R Int : 140 ms          QRS Dur : 098 ms      QT Int : 394 ms       P-R-T Axes : 035 072 070 degrees     QTc Int : 431 ms    Normal sinus rhythm  Incomplete right bundle branch block  Borderline Abnormal ECG  No previous ECGs available    Referred By: [email or web address]   SELF           Confirmed By:                                   Imaging Results              X-Ray Chest PA And Lateral (Final result)  Result time 03/19/24 09:36:48      Final result by Jordan Torrez MD (03/19/24 09:36:48)                   Narrative:    XR CHEST 2 VIEWS    CLINICAL HISTORY:  48 years Male epigastric abdominal pain    COMPARISON: None    FINDINGS: Cardiomediastinal  silhouette size is within normal limits. Atherosclerotic calcification of the aorta. Lungs are normally expanded with no airspace consolidation. No pleural effusion or pneumothorax. No acute osseous abnormality.    IMPRESSION: No acute pulmonary process.    Electronically signed by:  Jordan Torrez MD  03/19/2024 09:36 AM CDT Workstation: [id number]                                     Medications   famotidine (PF) injection 20 mg (20 mg Intravenous Given 3/19/24 1005)   aluminum-magnesium hydroxide-simethicone 200-200-20 mg/5 mL suspension 30 mL (30 mLs Oral Given 3/19/24 1005)     And   LIDOcaine viscous HCl 2% oral solution 15 mL (15 mLs Oral Given 3/19/24 1005)     Medical Decision Making  48-year-old male with epigastric abdominal pain x2 days.  His EKG shows sinus rhythm and incomplete right bundle-branch block but no evidence of ischemic changes.  Patient's symptoms do not seem consistent with cardiac etiology.  His abdominal exam is very benign without reproducible tenderness.  Seem consistent with gastritis/PUD.  No leukocytosis.  Normal LFTs and lipase.  Symptoms improved with IV Pepcid and GI cocktail.  I will discharge with Protonix and Carafate and outpatient follow-up.  Return precautions given.    Amount and/or Complexity of Data Reviewed  Radiology: ordered.    Risk  OTC drugs.  Prescription drug management.                                      Clinical Impression:  Final diagnoses:  [R94.31] Incomplete RBBB  [R10.13] Epigastric abdominal pain  [K29.00] Acute gastritis, presence of bleeding unspecified, unspecified gastritis type (Primary)          ED Disposition Condition    Discharge Stable          ED Prescriptions       Medication Sig Dispense Start Date End Date Auth. Provider    pantoprazole (PROTONIX) 40 MG tablet Take 1 tablet (40 mg total) by mouth once daily. 30 tablet 3/19/2024 3/19/2025 Alexi Salmeron MD    sucralfate (CARAFATE) 1 gram tablet Take 1 tablet (1 g total) by mouth 4 (four)  times daily before meals and nightly. 40 tablet 3/19/2024 -- Alexi Salmeron MD          Follow-up Information       Follow up With Specialties Details Why Contact Info Additional Information    [facility] - Emergency Dept Emergency Medicine  As needed, If symptoms worsen [address] [id number] [phone] 1st floor    PCP         VALENTINE William MD Gastroenterology   [address]  [address]  [place] [address]  [phone]                Alexi Salmeron MD  03/19/24 1105

## 2024-03-19 NOTE — DISCHARGE INSTRUCTIONS
Return to the ER for worsening pain, or for any other concerns.  Follow-up with your PCP for re-evaluation.

## 2024-04-11 LAB
OHS QRS DURATION: 98 MS
OHS QTC CALCULATION: 431 MS

## 2024-10-22 ENCOUNTER — PATIENT MESSAGE (OUTPATIENT)
Dept: RESEARCH | Facility: HOSPITAL | Age: 49
End: 2024-10-22
Payer: MEDICAID

## 2024-12-18 ENCOUNTER — OCCUPATIONAL HEALTH (OUTPATIENT)
Dept: URGENT CARE | Facility: CLINIC | Age: 49
End: 2024-12-18
Payer: MEDICAID